# Patient Record
Sex: FEMALE | URBAN - METROPOLITAN AREA
[De-identification: names, ages, dates, MRNs, and addresses within clinical notes are randomized per-mention and may not be internally consistent; named-entity substitution may affect disease eponyms.]

---

## 2018-08-02 ENCOUNTER — EMERGENCY (EMERGENCY)
Facility: HOSPITAL | Age: 33
LOS: 1 days | Discharge: HOME | End: 2018-08-02
Attending: EMERGENCY MEDICINE
Payer: MEDICAID

## 2018-08-02 VITALS
OXYGEN SATURATION: 99 % | SYSTOLIC BLOOD PRESSURE: 120 MMHG | HEART RATE: 81 BPM | RESPIRATION RATE: 14 BRPM | TEMPERATURE: 98 F | DIASTOLIC BLOOD PRESSURE: 72 MMHG

## 2018-08-02 LAB — HCG UR QL: NEGATIVE — SIGNIFICANT CHANGE UP

## 2018-08-02 PROCEDURE — 81025 URINE PREGNANCY TEST: CPT

## 2018-08-02 PROCEDURE — 93005 ELECTROCARDIOGRAM TRACING: CPT

## 2018-08-02 PROCEDURE — 99284 EMERGENCY DEPT VISIT MOD MDM: CPT | Mod: 25

## 2018-08-02 PROCEDURE — 99284 EMERGENCY DEPT VISIT MOD MDM: CPT

## 2018-08-02 PROCEDURE — 82962 GLUCOSE BLOOD TEST: CPT

## 2018-08-02 PROCEDURE — 70450 CT HEAD/BRAIN W/O DYE: CPT

## 2018-08-02 PROCEDURE — 70450 CT HEAD/BRAIN W/O DYE: CPT | Mod: 26

## 2018-08-02 NOTE — ED PROVIDER NOTE - PROGRESS NOTE DETAILS
Jacobo ALBARADO: Patient reassessed and reports feeling better. Patient will go home with Prednisone 40 mg for 5 days daily

## 2018-08-02 NOTE — ED ADULT NURSE NOTE - NSIMPLEMENTINTERV_GEN_ALL_ED
Implemented All Universal Safety Interventions:  [place] to call system. Call bell, personal items and telephone within reach. Instruct patient to call for assistance. Room bathroom lighting operational. Non-slip footwear when patient is off stretcher. Physically safe environment: no spills, clutter or unnecessary equipment. Stretcher in lowest position, wheels locked, appropriate side rails in place.

## 2018-08-02 NOTE — ED PROVIDER NOTE - NS_ ATTENDINGSCRIBEDETAILS _ED_A_ED_FT
The scribe's documentation has been prepared under my direction and personally reviewed by me in its entirety. I confirm that the note above accurately reflects all work, treatment, procedures, and medical decision making performed by me (Dr. Minesh Watson).

## 2018-08-02 NOTE — ED PROVIDER NOTE - MEDICAL DECISION MAKING DETAILS
34 y/o F pt presents with acute right facial numbness, on exam, entire right side of face is weak consistent  peripheral lesion, no concern for CVA, likely Bell's Palsy, pt will be reassessed and likely to d/c home.

## 2018-08-02 NOTE — ED PROVIDER NOTE - CHPI ED SYMPTOMS NEG
no dysarthria, no focal weakness, no neck pain, no neck stiffness, no vertigo, no headache, no double vision, no trauma

## 2018-08-02 NOTE — ED PROVIDER NOTE - ENMT, MLM
Airway patent, Nasal mucosa clear. Mouth with normal mucosa. Throat has no vesicles, no oropharyngeal exudates and uvula is midline, TM normal b/l, no signs of nystagmus.

## 2018-08-02 NOTE — ED ADULT NURSE NOTE - OBJECTIVE STATEMENT
axox3 ,nad , R side facial numbness and R neck pain and shoulder /arm , ,denies any facial numbness .

## 2018-08-02 NOTE — ED PROVIDER NOTE - OBJECTIVE STATEMENT
34 y/o F pt w/ a significant PMHx of Depression, Bipolar disorder, PCOS, Hypothyroidism, and no significant PSHx presents to the ED with right facial numbness. Patient also states she has been experiencing x3 days of right lower facial numbness, along with right shoulder pain.  Patient denies any dysarthria, focal weakness, neck pain, neck stiffness, vertigo, headache, double vision, trauma, or any other complaints. Patient speaks Ci[place] and prefers for her sister to be her . Patient is on Metformin for PCOS. NKDA.

## 2020-03-30 NOTE — ED PROVIDER NOTE - CROS ED NEURO ALL NEG
- - -
Implemented All Universal Safety Interventions:  [place] to call system. Call bell, personal items and telephone within reach. Instruct patient to call for assistance. Room bathroom lighting operational. Non-slip footwear when patient is off stretcher. Physically safe environment: no spills, clutter or unnecessary equipment. Stretcher in lowest position, wheels locked, appropriate side rails in place.
